# Patient Record
Sex: FEMALE
[De-identification: names, ages, dates, MRNs, and addresses within clinical notes are randomized per-mention and may not be internally consistent; named-entity substitution may affect disease eponyms.]

---

## 2017-08-23 ENCOUNTER — CHARTING TRANS (OUTPATIENT)
Dept: OTHER | Age: 32
End: 2017-08-23

## 2018-02-08 ENCOUNTER — CHARTING TRANS (OUTPATIENT)
Dept: OTHER | Age: 33
End: 2018-02-08

## 2018-02-10 ENCOUNTER — HOSPITAL ENCOUNTER (EMERGENCY)
Facility: HOSPITAL | Age: 33
Discharge: HOME | End: 2018-02-10
Attending: EMERGENCY MEDICINE
Payer: COMMERCIAL

## 2018-02-10 VITALS
WEIGHT: 240 LBS | HEIGHT: 71 IN | BODY MASS INDEX: 33.6 KG/M2 | OXYGEN SATURATION: 98 % | RESPIRATION RATE: 16 BRPM | DIASTOLIC BLOOD PRESSURE: 82 MMHG | TEMPERATURE: 99 F | SYSTOLIC BLOOD PRESSURE: 126 MMHG | HEART RATE: 86 BPM

## 2018-02-10 DIAGNOSIS — O21.0 HYPEREMESIS GRAVIDARUM: Primary | ICD-10-CM

## 2018-02-10 LAB
ALBUMIN SERPL-MCNC: 3.6 G/DL (ref 3.5–4.8)
ALP LIVER SERPL-CCNC: 59 U/L (ref 37–98)
ALT SERPL-CCNC: 25 U/L (ref 14–54)
AST SERPL-CCNC: 17 U/L (ref 15–41)
BASOPHILS # BLD AUTO: 0.06 X10(3) UL (ref 0–0.1)
BASOPHILS NFR BLD AUTO: 0.4 %
BILIRUB SERPL-MCNC: 0.6 MG/DL (ref 0.1–2)
BILIRUB UR QL STRIP.AUTO: NEGATIVE
BUN BLD-MCNC: 9 MG/DL (ref 8–20)
CALCIUM BLD-MCNC: 9.5 MG/DL (ref 8.3–10.3)
CHLORIDE: 106 MMOL/L (ref 101–111)
CO2: 23 MMOL/L (ref 22–32)
COLOR UR AUTO: YELLOW
CREAT BLD-MCNC: 0.52 MG/DL (ref 0.55–1.02)
EOSINOPHIL # BLD AUTO: 0.1 X10(3) UL (ref 0–0.3)
EOSINOPHIL NFR BLD AUTO: 0.6 %
ERYTHROCYTE [DISTWIDTH] IN BLOOD BY AUTOMATED COUNT: 13.2 % (ref 11.5–16)
GLUCOSE BLD-MCNC: 88 MG/DL (ref 70–99)
GLUCOSE UR STRIP.AUTO-MCNC: NEGATIVE MG/DL
HCG QUANTITATIVE: ABNORMAL MIU/ML (ref ?–3)
HCT VFR BLD AUTO: 38.4 % (ref 34–50)
HGB BLD-MCNC: 12.7 G/DL (ref 12–16)
IMMATURE GRANULOCYTE COUNT: 0.06 X10(3) UL (ref 0–1)
IMMATURE GRANULOCYTE RATIO %: 0.4 %
KETONES UR STRIP.AUTO-MCNC: NEGATIVE MG/DL
LIPASE: 153 U/L (ref 73–393)
LYMPHOCYTES # BLD AUTO: 2.62 X10(3) UL (ref 0.9–4)
LYMPHOCYTES NFR BLD AUTO: 16.9 %
M PROTEIN MFR SERPL ELPH: 7.7 G/DL (ref 6.1–8.3)
MCH RBC QN AUTO: 30.9 PG (ref 27–33.2)
MCHC RBC AUTO-ENTMCNC: 33.1 G/DL (ref 31–37)
MCV RBC AUTO: 93.4 FL (ref 81–100)
MONOCYTES # BLD AUTO: 1.01 X10(3) UL (ref 0.1–1)
MONOCYTES NFR BLD AUTO: 6.5 %
NEUTROPHIL ABS PRELIM: 11.62 X10 (3) UL (ref 1.3–6.7)
NEUTROPHILS # BLD AUTO: 11.62 X10(3) UL (ref 1.3–6.7)
NEUTROPHILS NFR BLD AUTO: 75.2 %
NITRITE UR QL STRIP.AUTO: NEGATIVE
PH UR STRIP.AUTO: 8 [PH] (ref 4.5–8)
PLATELET # BLD AUTO: 262 10(3)UL (ref 150–450)
POCT LOT NUMBER: NORMAL
POCT URINE PREGNANCY: POSITIVE
POTASSIUM SERPL-SCNC: 3.9 MMOL/L (ref 3.6–5.1)
PROT UR STRIP.AUTO-MCNC: NEGATIVE MG/DL
RBC # BLD AUTO: 4.11 X10(6)UL (ref 3.8–5.1)
RBC UR QL AUTO: NEGATIVE
RED CELL DISTRIBUTION WIDTH-SD: 45.2 FL (ref 35.1–46.3)
RH BLOOD TYPE: POSITIVE
SODIUM SERPL-SCNC: 137 MMOL/L (ref 136–144)
SP GR UR STRIP.AUTO: 1.02 (ref 1–1.03)
UROBILINOGEN UR STRIP.AUTO-MCNC: <2 MG/DL
WBC # BLD AUTO: 15.5 X10(3) UL (ref 4–13)

## 2018-02-10 PROCEDURE — 99283 EMERGENCY DEPT VISIT LOW MDM: CPT

## 2018-02-10 PROCEDURE — 81001 URINALYSIS AUTO W/SCOPE: CPT | Performed by: EMERGENCY MEDICINE

## 2018-02-10 PROCEDURE — 87086 URINE CULTURE/COLONY COUNT: CPT | Performed by: EMERGENCY MEDICINE

## 2018-02-10 PROCEDURE — 84702 CHORIONIC GONADOTROPIN TEST: CPT | Performed by: EMERGENCY MEDICINE

## 2018-02-10 PROCEDURE — 86901 BLOOD TYPING SEROLOGIC RH(D): CPT | Performed by: EMERGENCY MEDICINE

## 2018-02-10 PROCEDURE — 80053 COMPREHEN METABOLIC PANEL: CPT | Performed by: EMERGENCY MEDICINE

## 2018-02-10 PROCEDURE — 36415 COLL VENOUS BLD VENIPUNCTURE: CPT

## 2018-02-10 PROCEDURE — 86900 BLOOD TYPING SEROLOGIC ABO: CPT | Performed by: EMERGENCY MEDICINE

## 2018-02-10 PROCEDURE — 85025 COMPLETE CBC W/AUTO DIFF WBC: CPT | Performed by: EMERGENCY MEDICINE

## 2018-02-10 PROCEDURE — 83690 ASSAY OF LIPASE: CPT | Performed by: EMERGENCY MEDICINE

## 2018-02-10 RX ORDER — ONDANSETRON 4 MG/1
4 TABLET, ORALLY DISINTEGRATING ORAL EVERY 4 HOURS PRN
Qty: 10 TABLET | Refills: 0 | Status: SHIPPED | OUTPATIENT
Start: 2018-02-10 | End: 2018-02-17

## 2018-02-10 NOTE — ED NOTES
DC instructions, RX and work note handed to pt. No distress noted. Pt denies any needs. Pt thanks staff for care.  Denies need for [facility] out of ED

## 2018-02-10 NOTE — ED PROVIDER NOTES
Patient Seen in: [facility] Emergency Department    History   Patient presents with:  Nausea/Vomiting/Diarrhea (gastrointestinal)  Abdomen/Flank Pain (GI/)    Stated Complaint: nvd    HPI    Patient is a pleasant 77-year-old female, presenting for Pupils are equal and reactive to light. Oropharynx is pink and moist.  NECK: Neck is supple and nontender. The trachea is midline. LUNGS: Lungs are clear to auscultation bilaterally, respirations are unlabored. HEART: Regular rate and rhythm.  There are Please view results for these tests on the individual orders.    POCT PREGNANCY, URINE   ABORH (BLOOD TYPE)   RAINBOW DRAW LAVENDER   RAINBOW DRAW LIGHT GREEN   RAINBOW DRAW GOLD   RAINBOW DRAW BLUE   URINE CULTURE, ROUTINE       ED Course as of Feb 1

## 2023-02-27 ENCOUNTER — PATIENT OUTREACH (OUTPATIENT)
Dept: CASE MANAGEMENT | Age: 38
End: 2023-02-27

## 2023-02-28 NOTE — PROCEDURES
The office order for PCP removal request is Approved and finalized on February 27, 2023.     Thanks,  [facility] [employer]

## (undated) NOTE — LETTER
February 10, 2018    Patient: Carolyn Connor   Date of Visit: 2/10/2018       To Whom It May Concern:    Carolyn Connor was seen and treated in our emergency department on 2/10/2018. She may return to work on Monday, Feb 12th.     If you have any question

## (undated) NOTE — ED AVS SNAPSHOT
Katerina Barahona   MRN: [id number]    Department:  [facility] Emergency Department   Date of Visit:  2/10/2018           Disclosure     Insurance plans vary and the physician(s) referred by the ER may not be covered by your plan.  Please contact your tell this physician (or your personal doctor if your instructions are to return to your personal doctor) about any new or lasting problems. The primary care or specialist physician will see patients referred from the [facility] Emergency Department.  Claudia Amaya